# Patient Record
Sex: FEMALE | Race: WHITE
[De-identification: names, ages, dates, MRNs, and addresses within clinical notes are randomized per-mention and may not be internally consistent; named-entity substitution may affect disease eponyms.]

---

## 2018-02-26 ENCOUNTER — HOSPITAL ENCOUNTER (OUTPATIENT)
Dept: HOSPITAL 89 - RAD | Age: 53
End: 2018-02-26
Attending: PHYSICIAN ASSISTANT
Payer: COMMERCIAL

## 2018-02-26 DIAGNOSIS — J18.9: Primary | ICD-10-CM

## 2018-02-26 PROCEDURE — 71046 X-RAY EXAM CHEST 2 VIEWS: CPT

## 2018-02-26 NOTE — RADIOLOGY IMAGING REPORT
FACILITY: [facility] 

 

PATIENT NAME: Emma Mcqueen

: 1965

MR: [id number]

V: [id number]

EXAM DATE: 

ORDERING PHYSICIAN: DIONI HC

TECHNOLOGIST: 

 

[place] [facility] [place]

Patient: Emma Mcqueen

: 1965

MRN: [id number]

Visit/Account:[id number]

Date of Sevice:  2018

 

ACCESSION #: [id number]

 

CHEST PA AND LAT

 

HISTORY: Pneumonia

 

COMPARISON: None

 

FINDINGS:

 

Cardiomediastinal contours: Normal

Lungs and pleura: Normal

Bones/soft tissues: Normal

Other findings: None significant

 

IMPRESSION:

 

1. Normal chest

 

Report Dictated By: Brown Cancino MD at 2018 4:48 PM

 

Report E-Signed By: Brown Cancino MD  at 2018 4:48 PM

 

WSN:[id number]